# Patient Record
Sex: MALE | Race: WHITE | NOT HISPANIC OR LATINO
[De-identification: names, ages, dates, MRNs, and addresses within clinical notes are randomized per-mention and may not be internally consistent; named-entity substitution may affect disease eponyms.]

---
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
## 2018-01-08 ENCOUNTER — OFFICE VISIT - HEALTHEAST (OUTPATIENT)
Dept: PEDIATRICS | Facility: CLINIC | Age: 18
End: 2018-01-08

## 2018-01-08 DIAGNOSIS — L70.0 ACNE VULGARIS: ICD-10-CM

## 2018-01-08 DIAGNOSIS — B07.9 VERRUCA VULGARIS: ICD-10-CM

## 2018-01-08 ASSESSMENT — MIFFLIN-ST. JEOR: SCORE: 1692.31

## 2018-02-01 ENCOUNTER — COMMUNICATION - HEALTHEAST (OUTPATIENT)
Dept: PEDIATRICS | Facility: CLINIC | Age: 18
End: 2018-02-01

## 2018-03-13 ENCOUNTER — COMMUNICATION - HEALTHEAST (OUTPATIENT)
Dept: PEDIATRICS | Facility: CLINIC | Age: 18
End: 2018-03-13

## 2018-03-13 DIAGNOSIS — B07.9 VERRUCA VULGARIS: ICD-10-CM

## 2018-03-13 DIAGNOSIS — L70.0 ACNE VULGARIS: ICD-10-CM

## 2018-05-09 ENCOUNTER — OFFICE VISIT - HEALTHEAST (OUTPATIENT)
Dept: PEDIATRICS | Facility: CLINIC | Age: 18
End: 2018-05-09

## 2018-05-09 DIAGNOSIS — L70.0 ACNE VULGARIS: ICD-10-CM

## 2018-05-09 DIAGNOSIS — R63.4 WEIGHT LOSS, UNINTENTIONAL: ICD-10-CM

## 2018-05-09 LAB
ALBUMIN SERPL-MCNC: 4.5 G/DL (ref 3.5–5)
ALP SERPL-CCNC: 73 U/L (ref 45–120)
ALT SERPL W P-5'-P-CCNC: 11 U/L (ref 0–45)
ANION GAP SERPL CALCULATED.3IONS-SCNC: 10 MMOL/L (ref 5–18)
AST SERPL W P-5'-P-CCNC: 18 U/L (ref 0–40)
BILIRUB SERPL-MCNC: 1.4 MG/DL (ref 0–1)
BUN SERPL-MCNC: 12 MG/DL (ref 8–22)
CALCIUM SERPL-MCNC: 9.8 MG/DL (ref 8.5–10.5)
CHLORIDE BLD-SCNC: 102 MMOL/L (ref 98–107)
CO2 SERPL-SCNC: 29 MMOL/L (ref 22–31)
CREAT SERPL-MCNC: 0.83 MG/DL (ref 0.7–1.3)
ERYTHROCYTE [DISTWIDTH] IN BLOOD BY AUTOMATED COUNT: 11.6 % (ref 11–14.5)
ERYTHROCYTE [SEDIMENTATION RATE] IN BLOOD BY WESTERGREN METHOD: 2 MM/HR (ref 0–15)
GFR SERPL CREATININE-BSD FRML MDRD: >60 ML/MIN/1.73M2
GLUCOSE BLD-MCNC: 89 MG/DL (ref 70–125)
HCT VFR BLD AUTO: 45.5 % (ref 40–54)
HGB BLD-MCNC: 15.7 G/DL (ref 14–18)
MCH RBC QN AUTO: 31.8 PG (ref 27–34)
MCHC RBC AUTO-ENTMCNC: 34.6 G/DL (ref 32–36)
MCV RBC AUTO: 92 FL (ref 80–100)
PLATELET # BLD AUTO: 198 THOU/UL (ref 140–440)
PMV BLD AUTO: 7.8 FL (ref 7–10)
POTASSIUM BLD-SCNC: 3.8 MMOL/L (ref 3.5–5)
PROT SERPL-MCNC: 7.6 G/DL (ref 6–8)
RBC # BLD AUTO: 4.95 MILL/UL (ref 4.4–6.2)
SODIUM SERPL-SCNC: 141 MMOL/L (ref 136–145)
T4 FREE SERPL-MCNC: 1 NG/DL (ref 0.7–1.8)
TSH SERPL DL<=0.005 MIU/L-ACNC: 1.44 UIU/ML (ref 0.3–5)
WBC: 6.1 THOU/UL (ref 4–11)

## 2018-05-09 RX ORDER — TRETINOIN 0.25 MG/G
CREAM TOPICAL DAILY
Qty: 45 G | Refills: 2 | Status: SHIPPED | OUTPATIENT
Start: 2018-05-09

## 2018-05-09 ASSESSMENT — MIFFLIN-ST. JEOR: SCORE: 1652.74

## 2018-05-14 ENCOUNTER — COMMUNICATION - HEALTHEAST (OUTPATIENT)
Dept: PEDIATRICS | Facility: CLINIC | Age: 18
End: 2018-05-14

## 2018-05-14 LAB
GLIADIN IGA SER-ACNC: 0.3 U/ML
GLIADIN IGG SER-ACNC: <0.4 U/ML
IGA SERPL-MCNC: 207 MG/DL (ref 65–400)
TTG IGA SER-ACNC: 0.3 U/ML
TTG IGG SER-ACNC: <0.6 U/ML

## 2021-05-27 ENCOUNTER — RECORDS - HEALTHEAST (OUTPATIENT)
Dept: ADMINISTRATIVE | Facility: CLINIC | Age: 21
End: 2021-05-27

## 2021-05-31 VITALS — HEIGHT: 72 IN | BODY MASS INDEX: 19.77 KG/M2 | WEIGHT: 146 LBS

## 2021-06-01 VITALS — WEIGHT: 136.4 LBS | HEIGHT: 72 IN | BODY MASS INDEX: 18.47 KG/M2

## 2021-06-15 NOTE — PROGRESS NOTES
Assessment & Plan:    1. Verruca vulgaris  Cryotherapy, skin lesion-after paring the wart on his right fourth toe down, cryotherapy was applied with 3 cycles of 10 seconds each of liquid nitrogen.  Patient tolerated procedure well.  Bandage applied afterwards.  Recommend salicylic acid nightly at home until either resolves and/or return for follow-up if not resolved within the next 2-3 weeks.  Because of the wart was discussed with mom and patient.  Recommend wearing socks and footwear at home as to not spread it around.   2. Acne vulgaris   given that he is tried benzyl peroxide and clindamycin gel in the past but not very religiously, we will go ahead and start him on Clindagel/benzyl peroxide twice a day and Retin-A topical once a day for the next 2 months.  Discussed the importance of daily use.  If it does not improve in the next 2 months would consider oral minocycline for 6 months or more.  We discussed other options such as Accutane but given the significant potential side effects and the fact that he does not have cystic acne that would be last resort at this time.  Mom and patient are agreeable to this plan.  Follow-up in 2 months.      Orders Placed This Encounter   Procedures     Cryotherapy, skin lesion       See patient instructions     Subjective:     Eugenio is a 17 y.o. male who is accompanied by mother here with complaint of acne for 4 years. Has been using benzoyl peroxide maybe once a day and 2 months worth antibiotics.  He came off of the antibiotics after 2 months and he thought maybe he saw minor improvement but not complete resolution.  He thinks that he might have been on clindamycin gel before but he was not using it regularly.  He also thinks he has a wart for the last couple months on his right fourth toe that it hurts when he pushes on it.  Would like to get it treated today.  Has not tried any medications over-the-counter.    11 ROS negative except noted above   PMHx, SocHx, FHX  "reviewed and updated     No Known Allergies  Current Outpatient Prescriptions on File Prior to Visit   Medication Sig Dispense Refill     [DISCONTINUED] betamethasone dipropionate (DIPROLENE) 0.05 % cream Apply a small amount to foreskin once daily for 6 weeks. 30 g 0     No current facility-administered medications on file prior to visit.        Objective:   /78  Pulse (!) 58  Ht 5' 11.5\" (1.816 m)  Wt 146 lb (66.2 kg)  BMI 20.08 kg/m2   .Exam:  Gen: alert and nontoxic appearing  HEENT: bilateral TM's and external ear canals normal: normal; nose:normal; mouth: MMM no lesions  Neck: no lymphadenopathy  Lungs: clear to auscultation bilaterally  CV: normal rate, regular rhythm, normal S1, S2, no murmurs, rubs, clicks or gallops  Abd: NL BS, NT/ND; no HSM or masses.    Skin: Multiple comedones on his face and a few on his hairline on his neck postauricular area.  No lesions on chest or back.  No cystic lesions noted.  He has a 1 cm hypertrophic/hyper keratotic lesion on the lateral aspect of his right fourth toe.    Labs:   Results for orders placed or performed in visit on 06/13/16   Cholesterol, Total   Result Value Ref Range    Cholesterol 180 (H) <=169 mg/dL        Joce Garcia MD     "

## 2021-06-16 PROBLEM — L70.0 ACNE VULGARIS: Status: ACTIVE | Noted: 2018-01-08

## 2021-06-16 PROBLEM — B07.9 VERRUCA VULGARIS: Status: ACTIVE | Noted: 2018-01-08

## 2021-06-17 NOTE — PROGRESS NOTES
"Name: Gabriel M Seaver  Age: 17 y.o.  Gender: male  : 2000  Date of Encounter: 2018      Assessment and Plan:    1. Weight loss, unintentional  Thyroid Stimulating Hormone (TSH)    T4, Free    HM2(CBC w/o Differential)    Sedimentation Rate    Celiac(Gluten)Antibody Panel    Comprehensive Metabolic Panel   2. Acne vulgaris  tretinoin (RETIN-A) 0.025 % cream       Patient Instructions   Resume clindamycin/benzoyl peroxide gel twice daily to acne-prone area.    Apply Retin A once daily at night.    Allow 4 to 6 weeks for maximum benefit.    We will call with lab results when all or back, sooner if urgent results.    Strongly recommend you gradually decrease and then discontinue smoking marijuana.  Let me know if you would like help with that.    If all normal, return in 1 to 2 months for weight check and acne follow up.      Chief Complaint   Patient presents with     Acne     mole on back, weight        HPI:  Gabriel M Seaver is a 17 y.o. old male who presents to the clinic with mom for evaluation of weight loss  He has had 10 lb weight loss since January of this year, and over the past 2 years is down about 17 lbs  He is not trying to lose weight  He has stomach aches a few times per week, sometimes has decreased appetite  He feels that smoking marijuana makes his stomach feel better    He has been not been using clindagel because he ran out.  He has not been using Retin A because he did not think it would help without the antibiotics.  Acne is on his face.      ROS:  No vomiting  BM once every other day, formed  No blood in stool  No heartburn  No fever  No joint complaints  No rashes    PMH:  No GI disease    FH:  Cousin with inflammatory bowel disease    Social:  Marijuana use most days by smoking    Objective:  Vitals: /62  Pulse 61  Ht 5' 11.75\" (1.822 m)  Wt 136 lb 6.4 oz (61.9 kg)  SpO2 99%  BMI 18.63 kg/m2    Gen: Alert, awake, well appearing  Head: Normocephalic with age appropriate " fontanelles.  Eyes: Red reflex present bilaterally. Pupils equally round and reactive to light. Conjunctivae and cornea clear  Ears: Right TM clear.  Left TM clear   Nose:  no rhinorrhea.  Throat:  Oropharynx clear.  Tonsils normal.  Neck: Supple.  No adenopathy.  Heart: Regular rate and rhythm; normal S1 and S2; no murmurs, gallops, or rubs.  Lungs: Unlabored respirations; symmetric chest expansion; clear breath sounds.  Abdomen: Soft, without organomegaly. Bowel sounds normal. Nontender without rebound. No masses palpable. No distention.  Genitalia: deferred  Extremities: No clubbing, cyanosis, or edema. Normal upper and lower extremities.  Skin: Clear  Mental Status: Alert, oriented, in no distress. Appropriate for age.  Neuro: Normal reflexes; normal tone; no focal deficits appreciated. Appropriate for age.    Pertinent results / imaging:  Labs pending          Tai Knox MD  5/9/2018

## 2021-09-13 ENCOUNTER — OFFICE VISIT (OUTPATIENT)
Dept: FAMILY MEDICINE | Facility: CLINIC | Age: 21
End: 2021-09-13
Payer: COMMERCIAL

## 2021-09-13 VITALS
HEIGHT: 72 IN | WEIGHT: 134.8 LBS | OXYGEN SATURATION: 100 % | DIASTOLIC BLOOD PRESSURE: 80 MMHG | SYSTOLIC BLOOD PRESSURE: 132 MMHG | RESPIRATION RATE: 22 BRPM | BODY MASS INDEX: 18.26 KG/M2 | HEART RATE: 93 BPM | TEMPERATURE: 98.1 F

## 2021-09-13 DIAGNOSIS — Z28.21 INFLUENZA VACCINATION DECLINED: ICD-10-CM

## 2021-09-13 DIAGNOSIS — B37.0 ORAL THRUSH: Primary | ICD-10-CM

## 2021-09-13 PROCEDURE — 99203 OFFICE O/P NEW LOW 30 MIN: CPT | Performed by: NURSE PRACTITIONER

## 2021-09-13 RX ORDER — CLOTRIMAZOLE 10 MG/1
10 LOZENGE ORAL
Qty: 35 LOZENGE | Refills: 0 | Status: SHIPPED | OUTPATIENT
Start: 2021-09-13 | End: 2021-09-20

## 2021-09-13 ASSESSMENT — PAIN SCALES - GENERAL: PAINLEVEL: MILD PAIN (2)

## 2021-09-13 ASSESSMENT — MIFFLIN-ST. JEOR: SCORE: 1649.45

## 2021-09-13 NOTE — PATIENT INSTRUCTIONS
Patient Education     Candida Infection: Thrush  Thrush is a fungal infection in the mouth and throat. Thrush doesn't usually affect healthy adults. It's more common in people with a weak immune system. It's also more likely if you take antibiotics. Thrush is normally not contagious.   Understanding fungus in the mouth and throat  Your mouth and throat normally contain millions of tiny organisms. These include bacteria and yeasts. Many of these don't cause any problems. In fact, they may help fight disease.   Yeasts are a type of fungus. A type of yeast called Candida normally lives on the membranes of your mouth and throat. It also lives in the digestive tract and on your skin. Usually, this yeast grows only in small amounts and is harmless. But in some cases, Candida can grow out of control and cause thrush. Thrush is related to other kinds of Candida infections that can occur at other parts of the body. Thrush refers to an infection of only the mouth and throat.   What causes thrush?  Thrush happens when something lets too much Candida grow inside your mouth and throat. Certain things that change the normal balance of organisms in the mouth can lead to thrush. One example is antibiotic medicine. This medicine may kill some of the normal bacteria in your mouth. Candida can then grow freely. People on antibiotics have an increased risk for thrush.   You have a higher risk for thrush if you:    Wear dentures    Are getting chemotherapy or radiation therapy    Have diabetes    Have a transplanted organ    Use corticosteroids, including inhaled corticosteroids for lung disease    Have a weak immune system, such as from HIV infection or AIDS    Are an older adult  Symptoms of thrush  Symptoms of thrush can include:    A dry, cottony feeling in your mouth    Cracking at the corners of the mouth    Loss of taste    Pain while eating or swallowing    White patches on the tongue and around the sides of the  "mouth  Diagnosing thrush  Your healthcare provider will ask about your medical history and your symptoms. He or she will look closely at your mouth and throat. White or red patches will be found and may be scraped with a tongue depressor. A sample may be looked at under a microscope or sent to a lab to test. Most cases are confirmed just by their appearance; testing can sometimes help to confirm thrush.   If you have thrush, you may also have esophageal candidiasis. This is more common in people who have AIDS or a weak immune system for another reason. Your healthcare provider may diagnose this based on your symptoms, and may check for this condition with an upper endoscopy. This is a procedure to look at the esophagus. A tissue sample may be taken to test.   Treatment for thrush  Thrush is usually treated with antifungal medicine. For mild cases, the medicine is often applied directly in your mouth and throat. This may be in the form of a  swish and swallow  medicine or an antifungal lozenge to suck on and dissolve in your mouth.   In more extensive cases, or if you have a weakened immune system, you may instead be treated with an antifungal pill. This can be a stronger treatment than a \"swish and swallow\" or lozenge antifungal. Or you may need medicine through an IV (intravenous) line. These treatments depend on how severe your infection is, and what other health conditions you have.   If you are at high risk for thrush, you may need to keep taking oral antifungal medicine. This is to help prevent thrush in the future.   What happens if you don t get treated for thrush?  If untreated, the Candida may make it difficult to eat or drink. Or it can spread to the esophagus and rarely to other parts your body.   Preventing thrush  You may be able to help prevent some cases of thrush. Make sure to:    Practice good oral hygiene.    Clean your dentures regularly as instructed. Make sure they fit you correctly.    After " using a corticosteroid inhaler, rinse out your mouth with water or mouthwash.    Don't use broad-spectrum antibiotics, if possible.    Get treated for health problems that increase your risk for thrush, such as diabetes or HIV.  When to call the healthcare provider  Call your healthcare provider right away if you have any of these:    Cottony feeling in your mouth    Loss of taste    Pain while eating or swallowing    White patches or plaques on your tongue or inside your mouth  [employer] last reviewed this educational content on 4/1/2020 2000-2021 [employer]. All rights reserved. This information is not intended as a substitute for professional medical care. Always follow your healthcare professional's instructions.

## 2021-09-30 ENCOUNTER — OFFICE VISIT (OUTPATIENT)
Dept: FAMILY MEDICINE | Facility: CLINIC | Age: 21
End: 2021-09-30
Payer: COMMERCIAL

## 2021-09-30 VITALS
TEMPERATURE: 98.8 F | OXYGEN SATURATION: 99 % | BODY MASS INDEX: 18.58 KG/M2 | WEIGHT: 137 LBS | HEART RATE: 87 BPM | DIASTOLIC BLOOD PRESSURE: 77 MMHG | RESPIRATION RATE: 26 BRPM | SYSTOLIC BLOOD PRESSURE: 137 MMHG

## 2021-09-30 DIAGNOSIS — K14.8 LESION OF TONGUE: Primary | ICD-10-CM

## 2021-09-30 PROCEDURE — 99213 OFFICE O/P EST LOW 20 MIN: CPT | Performed by: FAMILY MEDICINE

## 2021-09-30 ASSESSMENT — PAIN SCALES - GENERAL: PAINLEVEL: MILD PAIN (3)

## 2021-09-30 NOTE — PROGRESS NOTES
Assessment & Plan     Lesion of tongue  Was treated with Clotrimazole for oral thrush did not help.  More flat white lesion, nonpainful.  Never smoker, does not chew tobacco.  - Otolaryngology Referral; Future      Return in about 1 year (around 9/30/2022) for Routine preventive.    Palmer Arora MD  [facility] is a 20 year old who presents for the following health issues:  Been having white flat lesion at the side of tongue for almost a month now.  Not painful, conjunctive clear.  Has no sore throat, no fever, no chills.  Concern - mouth sores  Onset: almost two months  Description: blisters on dies of tongue and cheeks  Intensity: moderate  Progression of Symptoms:  worsening and constant  Accompanying Signs & Symptoms: none  Previous history of similar problem: none  Precipitating factors:        Worsened by: none  Alleviating factors:        Improved by: none  Therapies tried and outcome: Tretinoin; did not work    Review of Systems   Constitutional, HEENT, cardiovascular, pulmonary, gi and gu systems are negative, except as otherwise noted.      Objective    There were no vitals taken for this visit.  There is no height or weight on file to calculate BMI.  Physical Exam   GENERAL: healthy, alert and no distress  HENT: ear canals and TM's normal, nose.   mouth without ulcers or lesions.  Has flat, white lesion at sides of tongue,  No vesicular type of lesion, no swelling and redness.  NECK: no adenopathy, no asymmetry, masses, or scars and thyroid normal to palpation      Palmer Arora MD

## 2021-10-12 ENCOUNTER — OFFICE VISIT (OUTPATIENT)
Dept: OTOLARYNGOLOGY | Facility: CLINIC | Age: 21
End: 2021-10-12
Payer: COMMERCIAL

## 2021-10-12 VITALS
OXYGEN SATURATION: 98 % | HEART RATE: 70 BPM | RESPIRATION RATE: 16 BRPM | DIASTOLIC BLOOD PRESSURE: 78 MMHG | SYSTOLIC BLOOD PRESSURE: 117 MMHG

## 2021-10-12 DIAGNOSIS — R22.0 TONGUE SWELLING: Primary | ICD-10-CM

## 2021-10-12 PROCEDURE — 99203 OFFICE O/P NEW LOW 30 MIN: CPT | Performed by: OTOLARYNGOLOGY

## 2021-10-12 RX ORDER — DEXAMETHASONE 0.5 MG/5ML
1 SOLUTION ORAL 2 TIMES DAILY
Qty: 240 ML | Refills: 1 | Status: SHIPPED | OUTPATIENT
Start: 2021-10-12 | End: 2021-10-22

## 2021-10-12 NOTE — LETTER
10/12/2021         RE: Gabriel M Seaver  [address]  [facility] [address]        Dear Colleague,    Thank you for referring your patient, Gabriel M Seaver, to the [facility]. Please see a copy of my visit note below.    I am seeing this patient in consultation for lesion of tongue at the request of the provider Dr. Palmer Arora.    Chief Complaint - oral lesion    History of Present Illness - Gabriel M Seaver is a 20 year old male presents with a lesion on both sides of tongue. The patient has noticed for approximately 2 months. It is getting a little worse. It can be mildly painful. + citrus or spicy intolerance. occasional bleeding. Former smoker (quit a couple months ago), and no history of chewing tobacco. No lumps or swollen glands in the neck. I personally reviewed the relevant clinical notes in Epic including the primary care providers note. He tried stopping smoking and mycelex and it didn't help.     Past Medical History -   Patient Active Problem List   Diagnosis     Verruca vulgaris     Acne vulgaris       Current Medications -   Current Outpatient Medications:      tretinoin (RETIN-A) 0.025 % cream, [TRETINOIN (RETIN-A) 0.025 % CREAM] Apply topically daily. (Patient not taking: Reported on 9/13/2021), Disp: 45 g, Rfl: 2    Allergies - No Known Allergies    Social History -   Social History     Socioeconomic History     Marital status: Single     Spouse name: Not on file     Number of children: Not on file     Years of education: Not on file     Highest education level: Not on file   Occupational History     Not on file   Tobacco Use     Smoking status: Never Smoker     Smokeless tobacco: Never Used   Vaping Use     Vaping Use: Never used   Substance and Sexual Activity     Alcohol use: Yes     Comment: occasionally     Drug use: No     Sexual activity: Yes     Partners: Female   Other Topics Concern     Not on file   Social History Narrative     Not on file      Social Determinants of Health     Financial Resource Strain:      Difficulty of Paying Living Expenses:    Food Insecurity:      Worried About Running Out of Food in the Last Year:      Ran Out of Food in the Last Year:    Transportation Needs:      Lack of Transportation (Medical):      Lack of Transportation (Non-Medical):    Physical Activity:      Days of Exercise per Week:      Minutes of Exercise per Session:    Stress:      Feeling of Stress :    Social Connections:      Frequency of Communication with Friends and Family:      Frequency of Social Gatherings with Friends and Family:      Attends Congregational Services:      Active Member of Clubs or Organizations:      Attends Club or Organization Meetings:      Marital Status:    Intimate Partner Violence:      Fear of Current or Ex-Partner:      Emotionally Abused:      Physically Abused:      Sexually Abused:        Family History -   Family History   Problem Relation Age of Onset     Hyperlipidemia Father         dad has had cholesterol over 300       Review of Systems - As per HPI and PMHx, otherwise 7 system review of the head and neck negative.    Physical Exam  /78   Pulse 70   Resp 16   SpO2 98%   General - The patient is in no distress. Alert and oriented x3, answers questions and cooperates with examination appropriately.   Voice and Breathing - The patient was breathing comfortably without the use of accessory muscles. There was no wheezing, stridor, or stertor.  The patients voice was clear and strong.  Eyes - Extraocular movements intact. Sclera were not icteric or injected, conjunctiva were pink and moist.  Neurologic - Cranial nerves II-XII are grossly intact. Specifically, the facial nerve is intact, House-Brackmann grade 1 of 6.   Mouth - Examination of the oral cavity showed a linear indentations on bilateral lateral aspect of the tongue.  This coincides where his teeth come together and his tongue sits adjacent to this.  He has a  similar but more subtle line in both buccal mucosa. No true leukoplakia. Oropharynx - The walls of the oropharynx were smooth, symmetric, and had no lesions or ulcerations. The uvula was midline and the palate raised symmetrically.   Neck -  Soft. Non-tender. Palpation of the occipital, submental, submandibular, internal jugular chain, and supraclavicular nodes did not demonstrate any abnormal lymph nodes or masses. The parotid glands were without masses. Palpation of the thyroid was soft and smooth, with no nodules or goiter appreciated.  The trachea was midline.      A/P - Gabriel M Seaver is a 20 year old male with bilateral tongue and buccal mucosal linear ridges. This is from his teeth. Maybe from grinding.  I explained that this is normal.  However it can explain the sensitivity.  We can try a course of dexamethasone oral swish and spit.  Try this for 10 if 14 days.  He can reach out to me this does not work.  He may also want to try changing to more sensitive toothpaste.      Ender Cintron MD  Otolaryngology  [facility]        Again, thank you for allowing me to participate in the care of your patient.        Sincerely,        Ender Cintron MD

## 2021-10-12 NOTE — PROGRESS NOTES
I am seeing this patient in consultation for lesion of tongue at the request of the provider Dr. Palmer Arora.    Chief Complaint - oral lesion    History of Present Illness - Gabriel M Seaver is a 20 year old male presents with a lesion on both sides of tongue. The patient has noticed for approximately 2 months. It is getting a little worse. It can be mildly painful. + citrus or spicy intolerance. occasional bleeding. Former smoker (quit a couple months ago), and no history of chewing tobacco. No lumps or swollen glands in the neck. I personally reviewed the relevant clinical notes in Epic including the primary care providers note. He tried stopping smoking and mycelex and it didn't help.     Past Medical History -   Patient Active Problem List   Diagnosis     Verruca vulgaris     Acne vulgaris       Current Medications -   Current Outpatient Medications:      tretinoin (RETIN-A) 0.025 % cream, [TRETINOIN (RETIN-A) 0.025 % CREAM] Apply topically daily. (Patient not taking: Reported on 9/13/2021), Disp: 45 g, Rfl: 2    Allergies - No Known Allergies    Social History -   Social History     Socioeconomic History     Marital status: Single     Spouse name: Not on file     Number of children: Not on file     Years of education: Not on file     Highest education level: Not on file   Occupational History     Not on file   Tobacco Use     Smoking status: Never Smoker     Smokeless tobacco: Never Used   Vaping Use     Vaping Use: Never used   Substance and Sexual Activity     Alcohol use: Yes     Comment: occasionally     Drug use: No     Sexual activity: Yes     Partners: Female   Other Topics Concern     Not on file   Social History Narrative     Not on file     Social Determinants of Health     Financial Resource Strain:      Difficulty of Paying Living Expenses:    Food Insecurity:      Worried About Running Out of Food in the Last Year:      Ran Out of Food in the Last Year:    Transportation Needs:      Lack of  Transportation (Medical):      Lack of Transportation (Non-Medical):    Physical Activity:      Days of Exercise per Week:      Minutes of Exercise per Session:    Stress:      Feeling of Stress :    Social Connections:      Frequency of Communication with Friends and Family:      Frequency of Social Gatherings with Friends and Family:      Attends Sabianism Services:      Active Member of Clubs or Organizations:      Attends Club or Organization Meetings:      Marital Status:    Intimate Partner Violence:      Fear of Current or Ex-Partner:      Emotionally Abused:      Physically Abused:      Sexually Abused:        Family History -   Family History   Problem Relation Age of Onset     Hyperlipidemia Father         dad has had cholesterol over 300       Review of Systems - As per HPI and PMHx, otherwise 7 system review of the head and neck negative.    Physical Exam  /78   Pulse 70   Resp 16   SpO2 98%   General - The patient is in no distress. Alert and oriented x3, answers questions and cooperates with examination appropriately.   Voice and Breathing - The patient was breathing comfortably without the use of accessory muscles. There was no wheezing, stridor, or stertor.  The patients voice was clear and strong.  Eyes - Extraocular movements intact. Sclera were not icteric or injected, conjunctiva were pink and moist.  Neurologic - Cranial nerves II-XII are grossly intact. Specifically, the facial nerve is intact, House-Brackmann grade 1 of 6.   Mouth - Examination of the oral cavity showed a linear indentations on bilateral lateral aspect of the tongue.  This coincides where his teeth come together and his tongue sits adjacent to this.  He has a similar but more subtle line in both buccal mucosa. No true leukoplakia. Oropharynx - The walls of the oropharynx were smooth, symmetric, and had no lesions or ulcerations. The uvula was midline and the palate raised symmetrically.   Neck -  Soft. Non-tender.  Palpation of the occipital, submental, submandibular, internal jugular chain, and supraclavicular nodes did not demonstrate any abnormal lymph nodes or masses. The parotid glands were without masses. Palpation of the thyroid was soft and smooth, with no nodules or goiter appreciated.  The trachea was midline.      A/P - Gabriel M Seaver is a 20 year old male with bilateral tongue and buccal mucosal linear ridges. This is from his teeth. Maybe from grinding.  I explained that this is normal.  However it can explain the sensitivity.  We can try a course of dexamethasone oral swish and spit.  Try this for 10 if 14 days.  He can reach out to me this does not work.  He may also want to try changing to more sensitive toothpaste.      Ender Cintron MD  Otolaryngology  [facility]

## 2021-12-12 ENCOUNTER — HEALTH MAINTENANCE LETTER (OUTPATIENT)
Age: 21
End: 2021-12-12

## 2022-10-01 ENCOUNTER — HEALTH MAINTENANCE LETTER (OUTPATIENT)
Age: 22
End: 2022-10-01

## 2023-01-23 NOTE — PROGRESS NOTES
Assessment & Plan     Oral thrush    - clotrimazole (MYCELEX) 10 MG lozenge; Place 1 lozenge (10 mg) inside cheek 5 times daily for 7 days               Return in about 1 year (around 9/13/2022) for Physical Exam.    Talya New NP  [facility] MERCEDES Marquez is a 20 year old who presents for the following health issues     HPI     White coated tongue and blister for 1 month.  He tried mouth wash that did not help.  Stopped smoking marijuana 3 weeks ago, no change.  He previously was using marijuana daily.  No cold like symptoms, no difficulty swallowing.    He is traveling to [place] in October 2021 and asking about shots.  Per CDC, recommend hepatitis A, hep B, and Tdap.  Patient declines all three of these today, he can get next door at pharmacy as a walk in if he decides to get.    Wants right chest checked, feels a bony thickening    Review of Systems   Constitutional, HEENT, cardiovascular, pulmonary, gi and gu systems are negative, except as otherwise noted.      Objective    /80   Pulse 93   Temp 98.1  F (36.7  C) (Oral)   Resp 22   Ht 1.829 m (6')   Wt 61.1 kg (134 lb 12.8 oz)   SpO2 100%   BMI 18.28 kg/m    Body mass index is 18.28 kg/m .  Physical Exam   GENERAL: healthy, alert and no distress  EYES: Eyes grossly normal to inspection, PERRL and conjunctivae and sclerae normal  HENT: normal cephalic/atraumatic, ear canals and TM's normal, nose and mouth without ulcers or lesions, oral mucous membranes moist and white fine plaque left side of tongue, right side of tongue erythematous papule  RESP: lungs clear to auscultation - no rales, rhonchi or wheezes  CV: regular rate and rhythm, normal S1 S2, no S3 or S4, no murmur, click or rub  Chest:  Mild bony prominence over right anterior rib  PSYCH: mentation appears normal, affect normal/bright             Patient asymptomatic On assessment, pt. is asymptomatic. Denies SOB and/or chest pain at this time. /81, HR 81, o2 91% 2L NC On assessment, pt. is asymptomatic. Denies SOB and/or chest pain at this time. Patient having nose bleed, no signs of distress.

## 2023-02-05 ENCOUNTER — HEALTH MAINTENANCE LETTER (OUTPATIENT)
Age: 23
End: 2023-02-05

## 2024-03-09 ENCOUNTER — HEALTH MAINTENANCE LETTER (OUTPATIENT)
Age: 24
End: 2024-03-09